# Patient Record
(demographics unavailable — no encounter records)

---

## 2025-01-06 NOTE — CHRONIC CARE ASSESSMENT
[PPS Score: ____] : Palliative Performance Scale (PPS) Score: [unfilled] [FAST Score: ____] : Functional Assessment Scale (FAST) Score: [unfilled] [Class I] : [employer] Class Output: Class I

## 2025-01-06 NOTE — PHYSICAL EXAM
[No Acute Distress] : no acute distress [Normal Voice/Communication] : normal voice communication [Normal Sclera/Conjunctiva] : normal sclera/conjunctiva [PERRL] : pupils equal, round and reactive to light [Normal Outer Ear/Nose] : the ears and nose were normal in appearance [No JVD] : no jugular venous distention [No Respiratory Distress] : no respiratory distress [Clear to Auscultation] : lungs were clear to auscultation bilaterally [Normal Rate] : heart rate was normal  [Regular Rhythm] : with a regular rhythm [Normal Bowel Sounds] : normal bowel sounds [Non Tender] : non-tender [Soft] : abdomen soft [No CVA Tenderness] : no ~M costovertebral angle tenderness [No Spinal Tenderness] : no spinal tenderness [Normal Gait] : normal gait [No Joint Swelling] : no joint swelling seen [No Rash] : no rash [No Skin Lesions] : no skin lesions [Oriented x3] : oriented to person, place, and time

## 2025-01-13 NOTE — HISTORY OF PRESENT ILLNESS
[Patient is stable - had PCP appoinment] : patient is stable - had PCP appointment [Patient] : patient [LastPCPVisitDate] : 10/26/2023 [Patient is stable] : patient is stable [FreeTextEntry2] : HX: HTN, HLD, BPH, CAD with cardiac stents placed 3 years ago, left lower extremity DVT (currently on Eliquis and Plavix), GERD, type 2 diabetes mellitus, and lower extremity neuropathy.  Todays Events: patient reports feeling well and has no complaints.  Declined Flu vaccine. Blood Glucose 130 He also denies experiencing any chest pain, dizziness, palpitations, shortness of breath, fevers, chills, nausea, vomiting, diarrhea, or related symptoms.    Subjective: -Appetite/weight: appetite goo, weight stable -Gait/falls: gait steady, has a cane, no falls -Pain: Back Pain Taking Tylenol as needed. . -Sleep: No issues -BMs: daily BM -Urine: No Issues, taking Flomax C/O of dripping after emptying her bladder -Skin: Intact -DME: None -Mood/memory: Mood good, Memory good -Communication: conventional -Health Maintenance:  Declined Flu vaccine -Hospitalizations in the past year: None   Medical Issues: HTN- On Enalapril, Chlorthalidone HLD- on Atorvastatin Diabetes- On Glipizide BPH- on Flomax Heart stent / DVT- on Eliquis    Social  He maintains independence in his activities of daily living and instrumental activities of daily living. Mr. Rajput resides with his sister on the first floor of a 6-step and ramp-accessible apartment building that he rents. He has adequate social support from his family and denies having any significant social concern.Mr. Rajput enjoys fishing, but due to his lower back pain and unsteady gait, he reported that he is afraid he might fall into the ocean while fishing because of his imbalance.  PCP Dr Papito Robles last visit 2months ago, next viist 10/26/2023 Dr Pepper Bonner (Urologist Onc) or Next Injection Eliga Full Code

## 2025-01-13 NOTE — REASON FOR VISIT
[Follow-Up] : a follow-up visit [Pre-Visit Preparation] : pre-visit preparation was done [Intercurrent Specialty/Sub-specialty Visits] : the patient has intercurrent specialty/sub-specialty visits [FreeTextEntry3] : PCP Dr Papito Robles last visit 2months ago, next viist 10/26/2023 [FreeTextEntry1] : HTN, HLD, BPH, CAD with cardiac stents placed 3 years ago, left lower extremity DVT (currently on Eliquis and Plavix), GERD, type 2 diabetes mellitus, and lower extremity neuropathy. [FreeTextEntry2] : Chart Review

## 2025-01-13 NOTE — HEALTH RISK ASSESSMENT
[HRA Reviewed] : Health risk assessment reviewed [Independent] : managing finances [No falls in past year] : Patient reported no falls in the past year [No] : The patient does not have visual impairment [TimeGetUpGo] : 10 Simp[address] Counseling:  I discussed with the patient the risks of golimumab including but not limited to myelosuppression, immunosuppression, autoimmune hepatitis, demyelinating diseases, lymphoma, and serious infections.  The patient understands that monitoring is required including a PPD at baseline and must alert us or the primary physician if symptoms of infection or other concerning signs are noted.

## 2025-01-13 NOTE — COUNSELING
[Normal Weight - ( BMI  <25 )] : normal weight - ( BMI  <25 ) [Non - Smoker] : non-smoker [Smoke/CO Detectors] : smoke/CO detectors [Improve exercise tolerance] : improve exercise tolerance [Improve mobility] : improve mobility [Patient/Caregiver not ready to engage in discussion] : patient/caregiver not ready to engage in discussion [Full Code] : Code Status: Full Code [FreeTextEntry3] : diabetic diet